# Patient Record
Sex: FEMALE | Race: WHITE | NOT HISPANIC OR LATINO | Employment: OTHER | URBAN - METROPOLITAN AREA
[De-identification: names, ages, dates, MRNs, and addresses within clinical notes are randomized per-mention and may not be internally consistent; named-entity substitution may affect disease eponyms.]

---

## 2022-08-30 ENCOUNTER — APPOINTMENT (OUTPATIENT)
Dept: CT IMAGING | Facility: CLINIC | Age: 57
End: 2022-08-30
Attending: EMERGENCY MEDICINE

## 2022-08-30 ENCOUNTER — HOSPITAL ENCOUNTER (EMERGENCY)
Facility: CLINIC | Age: 57
Discharge: HOME | End: 2022-08-30
Attending: EMERGENCY MEDICINE | Admitting: EMERGENCY MEDICINE

## 2022-08-30 VITALS
DIASTOLIC BLOOD PRESSURE: 75 MMHG | OXYGEN SATURATION: 97 % | TEMPERATURE: 98.3 F | HEART RATE: 63 BPM | RESPIRATION RATE: 16 BRPM | SYSTOLIC BLOOD PRESSURE: 133 MMHG

## 2022-08-30 DIAGNOSIS — K08.89 PAIN, DENTAL: ICD-10-CM

## 2022-08-30 DIAGNOSIS — R10.9 FLANK PAIN: ICD-10-CM

## 2022-08-30 LAB
ALBUMIN SERPL-MCNC: 3.3 G/DL (ref 3.4–5)
ALBUMIN UR-MCNC: 30 MG/DL
ALP SERPL-CCNC: 101 U/L (ref 40–150)
ALT SERPL W P-5'-P-CCNC: 33 U/L (ref 0–50)
AMORPH CRY #/AREA URNS HPF: ABNORMAL /HPF
ANION GAP SERPL CALCULATED.3IONS-SCNC: 2 MMOL/L (ref 3–14)
APPEARANCE UR: ABNORMAL
AST SERPL W P-5'-P-CCNC: 38 U/L (ref 0–45)
BASOPHILS # BLD AUTO: 0 10E3/UL (ref 0–0.2)
BASOPHILS NFR BLD AUTO: 1 %
BILIRUB SERPL-MCNC: 0.3 MG/DL (ref 0.2–1.3)
BILIRUB UR QL STRIP: NEGATIVE
BUN SERPL-MCNC: 9 MG/DL (ref 7–30)
CALCIUM SERPL-MCNC: 9.1 MG/DL (ref 8.5–10.1)
CAOX CRY #/AREA URNS HPF: ABNORMAL /HPF
CHLORIDE BLD-SCNC: 109 MMOL/L (ref 94–109)
CO2 SERPL-SCNC: 30 MMOL/L (ref 20–32)
COLOR UR AUTO: YELLOW
CREAT SERPL-MCNC: 0.55 MG/DL (ref 0.52–1.04)
EOSINOPHIL # BLD AUTO: 0.1 10E3/UL (ref 0–0.7)
EOSINOPHIL NFR BLD AUTO: 2 %
ERYTHROCYTE [DISTWIDTH] IN BLOOD BY AUTOMATED COUNT: 13.5 % (ref 10–15)
GFR SERPL CREATININE-BSD FRML MDRD: >90 ML/MIN/1.73M2
GLUCOSE BLD-MCNC: 95 MG/DL (ref 70–99)
GLUCOSE UR STRIP-MCNC: NEGATIVE MG/DL
HCT VFR BLD AUTO: 38.1 % (ref 35–47)
HGB BLD-MCNC: 12.9 G/DL (ref 11.7–15.7)
HGB UR QL STRIP: NEGATIVE
HYALINE CASTS: 6 /LPF
IMM GRANULOCYTES # BLD: 0 10E3/UL
IMM GRANULOCYTES NFR BLD: 1 %
KETONES UR STRIP-MCNC: NEGATIVE MG/DL
LEUKOCYTE ESTERASE UR QL STRIP: ABNORMAL
LYMPHOCYTES # BLD AUTO: 2 10E3/UL (ref 0.8–5.3)
LYMPHOCYTES NFR BLD AUTO: 45 %
MCH RBC QN AUTO: 35 PG (ref 26.5–33)
MCHC RBC AUTO-ENTMCNC: 33.9 G/DL (ref 31.5–36.5)
MCV RBC AUTO: 103 FL (ref 78–100)
MONOCYTES # BLD AUTO: 0.6 10E3/UL (ref 0–1.3)
MONOCYTES NFR BLD AUTO: 13 %
MUCOUS THREADS #/AREA URNS LPF: PRESENT /LPF
NEUTROPHILS # BLD AUTO: 1.7 10E3/UL (ref 1.6–8.3)
NEUTROPHILS NFR BLD AUTO: 38 %
NITRATE UR QL: NEGATIVE
NRBC # BLD AUTO: 0 10E3/UL
NRBC BLD AUTO-RTO: 0 /100
PH UR STRIP: 5.5 [PH] (ref 5–7)
PLATELET # BLD AUTO: 222 10E3/UL (ref 150–450)
POTASSIUM BLD-SCNC: 3.9 MMOL/L (ref 3.4–5.3)
PROT SERPL-MCNC: 6.9 G/DL (ref 6.8–8.8)
RBC # BLD AUTO: 3.69 10E6/UL (ref 3.8–5.2)
RBC URINE: 3 /HPF
SODIUM SERPL-SCNC: 141 MMOL/L (ref 133–144)
SP GR UR STRIP: 1.03 (ref 1–1.03)
SQUAMOUS EPITHELIAL: 34 /HPF
UROBILINOGEN UR STRIP-MCNC: 2 MG/DL
WBC # BLD AUTO: 4.4 10E3/UL (ref 4–11)
WBC URINE: 4 /HPF

## 2022-08-30 PROCEDURE — 74176 CT ABD & PELVIS W/O CONTRAST: CPT

## 2022-08-30 PROCEDURE — 250N000013 HC RX MED GY IP 250 OP 250 PS 637: Performed by: EMERGENCY MEDICINE

## 2022-08-30 PROCEDURE — 250N000011 HC RX IP 250 OP 636: Performed by: EMERGENCY MEDICINE

## 2022-08-30 PROCEDURE — 85014 HEMATOCRIT: CPT | Performed by: EMERGENCY MEDICINE

## 2022-08-30 PROCEDURE — 96374 THER/PROPH/DIAG INJ IV PUSH: CPT

## 2022-08-30 PROCEDURE — 99284 EMERGENCY DEPT VISIT MOD MDM: CPT | Mod: 25

## 2022-08-30 PROCEDURE — 80053 COMPREHEN METABOLIC PANEL: CPT | Performed by: EMERGENCY MEDICINE

## 2022-08-30 PROCEDURE — 81001 URINALYSIS AUTO W/SCOPE: CPT | Performed by: EMERGENCY MEDICINE

## 2022-08-30 PROCEDURE — 99284 EMERGENCY DEPT VISIT MOD MDM: CPT | Performed by: EMERGENCY MEDICINE

## 2022-08-30 PROCEDURE — 36415 COLL VENOUS BLD VENIPUNCTURE: CPT | Performed by: EMERGENCY MEDICINE

## 2022-08-30 RX ORDER — KETOROLAC TROMETHAMINE 15 MG/ML
10 INJECTION, SOLUTION INTRAMUSCULAR; INTRAVENOUS ONCE
Status: COMPLETED | OUTPATIENT
Start: 2022-08-30 | End: 2022-08-30

## 2022-08-30 RX ORDER — HYDROCODONE BITARTRATE AND ACETAMINOPHEN 5; 325 MG/1; MG/1
1 TABLET ORAL ONCE
Status: COMPLETED | OUTPATIENT
Start: 2022-08-30 | End: 2022-08-30

## 2022-08-30 RX ORDER — SULFAMETHOXAZOLE/TRIMETHOPRIM 800-160 MG
1 TABLET ORAL 2 TIMES DAILY
Qty: 14 TABLET | Refills: 0 | Status: SHIPPED | OUTPATIENT
Start: 2022-08-30 | End: 2022-09-06

## 2022-08-30 RX ADMIN — HYDROCODONE BITARTRATE AND ACETAMINOPHEN 1 TABLET: 5; 325 TABLET ORAL at 14:07

## 2022-08-30 RX ADMIN — KETOROLAC TROMETHAMINE 10 MG: 15 INJECTION, SOLUTION INTRAMUSCULAR; INTRAVENOUS at 14:07

## 2022-08-30 ASSESSMENT — ENCOUNTER SYMPTOMS
FEVER: 0
VOMITING: 1
FLANK PAIN: 1
NAUSEA: 1
DYSURIA: 1
SHORTNESS OF BREATH: 0
ABDOMINAL PAIN: 0

## 2022-08-30 ASSESSMENT — ACTIVITIES OF DAILY LIVING (ADL)
ADLS_ACUITY_SCORE: 35
ADLS_ACUITY_SCORE: 35

## 2022-08-30 NOTE — ED PROVIDER NOTES
"ED Provider Note  [facility] EMERGENCY DEPARTMENT ([facility])    8/30/22          History     Chief Complaint   Patient presents with     UTI     Flank Pain     The history is provided by the patient and medical records.     Cortney Acevedo is a 57 year old female with past medical history significant for pyelonephritis who presents to the ED for evaluation of left flank pain and dental pain of a right lower tooth.  Patient presents here with her boyfriend.  She reports 3 weeks of dysuria and cloudy urine.  She tried taking ibuprofen and cranberry juice/pills for this without relief of symptoms.  Yesterday she developed a stabbing pain on the left flank.  She reports nausea and vomiting, last episode of emesis was today.  She has not eaten today.  She denies chance of pregnancy.  She denies any previous abdominal surgeries.  No current abdominal pain.  No chest pain, fever, or shortness of breath.  She reports a history of pyelonephritis several years ago.  She also reports that 15 years ago she believes she passed a kidney stone because she saw a \"sliver\" in the toilet and had excruciating pain.     The patient also reports several months of dental pain of a right lower tooth.      Past Medical History  No past medical history on file.  No past surgical history on file.  No current outpatient medications on file.    No Known Allergies  Family History  No family history on file.  Social History       Past medical history, past surgical history, medications, allergies, family history, and social history were reviewed with the patient. No additional pertinent items.       Review of Systems   Constitutional: Negative for fever.   HENT: Positive for dental problem (right lower tooth).    Respiratory: Negative for shortness of breath.    Cardiovascular: Negative for chest pain.   Gastrointestinal: Positive for nausea and vomiting. Negative for abdominal pain. "   Genitourinary: Positive for dysuria and flank pain (left).   All other systems reviewed and are negative.    A complete review of systems was performed with pertinent positives and negatives noted in the HPI, and all other systems negative.    Physical Exam   BP: 134/75  Pulse: 80  Temp: 98.2  F (36.8  C)  Resp: 18  SpO2: 98 %  Physical Exam  Constitutional:       General: She is not in acute distress.     Appearance: She is well-developed. She is not ill-appearing, toxic-appearing or diaphoretic.   HENT:      Head: Normocephalic and atraumatic.      Mouth/Throat:      Comments: Right tooth #31 is very loose.  No apical abscess.  Mild tenderness to touch.  No swelling past angle of mandible.  No trismus.  Cardiovascular:      Rate and Rhythm: Normal rate and regular rhythm.      Heart sounds: Normal heart sounds.   Pulmonary:      Effort: Pulmonary effort is normal. No respiratory distress.      Breath sounds: Normal breath sounds.   Abdominal:      General: There is no distension.      Palpations: Abdomen is soft. There is no mass.      Tenderness: There is no abdominal tenderness. There is no rebound.      Hernia: No hernia is present.   Musculoskeletal:         General: No tenderness.      Cervical back: Normal range of motion.   Skin:     General: Skin is warm and dry.   Neurological:      General: No focal deficit present.      Mental Status: She is alert and oriented to person, place, and time.   Psychiatric:         Mood and Affect: Mood normal.         Behavior: Behavior normal.         Thought Content: Thought content normal.         ED Course     1:25 PM  The patient was seen and examined by Paz Hsu MD in Room ED05.     Procedures       The medical record was reviewed and interpreted.  Current labs reviewed and interpreted.  Previous labs reviewed and interpreted.       Results for orders placed or performed during the hospital encounter of 08/30/22   CT Abdomen Pelvis w/o Contrast      Status: None    Narrative    CT ABDOMEN PELVIS W/O CONTRAST 8/30/2022 2:22 PM    CLINICAL HISTORY: left sided flank pain  TECHNIQUE: CT scan of the abdomen and pelvis was performed without IV  contrast. Multiplanar reformats were obtained. Dose reduction  techniques were used.  CONTRAST: None.    COMPARISON: None.    FINDINGS:   LOWER CHEST: Unremarkable.    HEPATOBILIARY: Normal.    PANCREAS: Normal.    SPLEEN: Normal.    ADRENAL GLANDS: Normal.    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 No nephroureterolithiasis or hydronephrosis. Urinary  bladder is unremarkable.    BOWEL: No obstruction or inflammatory change.    LYMPH NODES: Normal.    VASCULATURE: Mild scattered calcified atherosclerosis.    PELVIC ORGANS: Normal.    OTHER: None.    MUSCULOSKELETAL: No acute bony abnormality.      Impression    IMPRESSION:   1.  No visualized explanation for patient's symptoms. Specifically, no  nephroureterolithiasis or hydronephrosis.    RADHA MYRICK MD         SYSTEM ID:  [id number]   UA with Microscopic reflex to Culture     Status: Abnormal    Specimen: Urine, Midstream   Result Value Ref Range    Color Urine Yellow Colorless, Straw, Light Yellow, Yellow    Appearance Urine Slightly Cloudy (A) Clear    Glucose Urine Negative Negative mg/dL    Bilirubin Urine Negative Negative    Ketones Urine Negative Negative mg/dL    Specific Gravity Urine 1.027 1.003 - 1.035    Blood Urine Negative Negative    pH Urine 5.5 5.0 - 7.0    Protein Albumin Urine 30  (A) Negative mg/dL    Urobilinogen Urine 2.0 Normal, 2.0 mg/dL    Nitrite Urine Negative Negative    Leukocyte Esterase Urine Small (A) Negative    Mucus Urine Present (A) None Seen /LPF    Amorphous Crystals Urine Few (A) None Seen /HPF    Calcium Oxalate Crystals Urine Many (A) None Seen /HPF    RBC Urine 3 (H) <=2 /HPF    WBC Urine 4 <=5 /HPF    Squamous Epithelials Urine 34 (H) <=1 /HPF    Hyaline Casts Urine 6 (H) <=2 /LPF     Narrative    Urine Culture not indicated   Comprehensive metabolic panel     Status: Abnormal   Result Value Ref Range    Sodium 141 133 - 144 mmol/L    Potassium 3.9 3.4 - 5.3 mmol/L    Chloride 109 94 - 109 mmol/L    Carbon Dioxide (CO2) 30 20 - 32 mmol/L    Anion Gap 2 (L) 3 - 14 mmol/L    Urea Nitrogen 9 7 - 30 mg/dL    Creatinine 0.55 0.52 - 1.04 mg/dL    Calcium 9.1 8.5 - 10.1 mg/dL    Glucose 95 70 - 99 mg/dL    Alkaline Phosphatase 101 40 - 150 U/L    AST 38 0 - 45 U/L    ALT 33 0 - 50 U/L    Protein Total 6.9 6.8 - 8.8 g/dL    Albumin 3.3 (L) 3.4 - 5.0 g/dL    Bilirubin Total 0.3 0.2 - 1.3 mg/dL    GFR Estimate >90 >60 mL/min/1.73m2   CBC with platelets and differential     Status: Abnormal   Result Value Ref Range    WBC Count 4.4 4.0 - 11.0 10e3/uL    RBC Count 3.69 (L) 3.80 - 5.20 10e6/uL    Hemoglobin 12.9 11.7 - 15.7 g/dL    Hematocrit 38.1 35.0 - 47.0 %     (H) 78 - 100 fL    MCH 35.0 (H) 26.5 - 33.0 pg    MCHC 33.9 31.5 - 36.5 g/dL    RDW 13.5 10.0 - 15.0 %    Platelet Count 222 150 - 450 10e3/uL    % Neutrophils 38 %    % Lymphocytes 45 %    % Monocytes 13 %    % Eosinophils 2 %    % Basophils 1 %    % Immature Granulocytes 1 %    NRBCs per 100 WBC 0 <1 /100    Absolute Neutrophils 1.7 1.6 - 8.3 10e3/uL    Absolute Lymphocytes 2.0 0.8 - 5.3 10e3/uL    Absolute Monocytes 0.6 0.0 - 1.3 10e3/uL    Absolute Eosinophils 0.1 0.0 - 0.7 10e3/uL    Absolute Basophils 0.0 0.0 - 0.2 10e3/uL    Absolute Immature Granulocytes 0.0 <=0.4 10e3/uL    Absolute NRBCs 0.0 10e3/uL   CBC with platelets differential     Status: Abnormal    Narrative    The following orders were created for panel order CBC with platelets differential.  Procedure                               Abnormality         Status                     ---------                               -----------         ------                     CBC with platelets and d...[[id number]]  Abnormal            Final result                 Please view results  for these tests on the individual orders.     Medications   HYDROcodone-acetaminophen (NORCO) 5-325 MG per tablet 1 tablet (1 tablet Oral Given 8/30/22 1407)   ketorolac (TORADOL) injection 10 mg (10 mg Intravenous Given 8/30/22 1407)            No results found for any visits on 08/30/22.  Medications - No data to display     Assessments & Plan (with Medical Decision Making)   Patient presents to the ER due to 3 weeks of ongoing right-sided pain with concern for UTI.  Patient's UA shows no acute infection though there is significant crystals that are visible.  We did obtain a CT abdomen and pelvis to look for kidney stone which is negative.  Plan will be to treat with a course of Bactrim to cover both the dental pain and possible occult UTI.  Patient agrees this plan of care.  Patient to follow-up with both PCP and dental for further care as an outpatient.    I have reviewed the nursing notes. I have reviewed the findings, diagnosis, plan and need for follow up with the patient.    New Prescriptions    No medications on file       Final diagnoses:   Flank pain   Pain, dental     I, Beena Solorzano, am serving as a trained medical scribe to document services personally performed by Paz Hsu MD based on the provider's statements to me on August 30, 2022.  This document has been checked and approved by the attending provider.    I, Paz Hsu MD, was physically present and have reviewed and verified the accuracy of this note documented by Beena Solorzano, medical scribe.      --    [facility] EMERGENCY DEPARTMENT  8/30/2022     Paz Hsu MD  08/30/22 1518

## 2022-08-30 NOTE — ED TRIAGE NOTES
Pt reports having UTI's on and off for about a month, has not been treated for them. Woke up today with intense left kidney pain. Patient also reports a loose wisdom tooth on left lower jaw that is causing intense pain.

## 2022-08-30 NOTE — DISCHARGE INSTRUCTIONS
Take the antibiotics as prescribed.     Your CT abdomen is normal with no signs of kidney stone.     Please make an appointment to follow [facility] with [facility] (phone: [phone]) in 3-5 days even if entirely better.    Orajel or Anbesol to affected area. (You may obtain this from any pharmacy)  Tylenol or Ibuprofen for pain.  Use prescription medication as directed.  Follow up with your Dentist or a dental clinic listed below.    Many of these clinics offer a sliding fee option for patients that qualify, and see patients on a walk-in or same day basis. Please call each clinic directly. As services, hours, fees and policies vary greatly.    [place]:  [facility]     [phone]  [facility] [phone]  [facility]  [phone]  [facility]      [phone]   [facility]    [phone]  [facility]  [phone]  [facility] [phone]  [facility]     [phone]  [facility]   [phone]  [facility] (cash only)   [phone]  [facility]    [phone] (adults)          [phone] (children)    [place]:  [facility]     [phone]; [phone]  [facility]     [phone]  [facility]     [phone]  [facility] (free, limited)    [phone]    Multiple Locations:  [facility]       [phone]

## 2024-02-22 ENCOUNTER — HOSPITAL ENCOUNTER (EMERGENCY)
Facility: CLINIC | Age: 59
Discharge: HOME | End: 2024-02-22
Attending: EMERGENCY MEDICINE | Admitting: EMERGENCY MEDICINE

## 2024-02-22 VITALS
SYSTOLIC BLOOD PRESSURE: 111 MMHG | BODY MASS INDEX: 31.49 KG/M2 | DIASTOLIC BLOOD PRESSURE: 66 MMHG | TEMPERATURE: 97.4 F | RESPIRATION RATE: 16 BRPM | HEIGHT: 58 IN | WEIGHT: 150 LBS | OXYGEN SATURATION: 94 %

## 2024-02-22 DIAGNOSIS — K04.7 TOOTH INFECTION: ICD-10-CM

## 2024-02-22 PROCEDURE — 99283 EMERGENCY DEPT VISIT LOW MDM: CPT | Performed by: EMERGENCY MEDICINE

## 2024-02-22 PROCEDURE — 250N000013 HC RX MED GY IP 250 OP 250 PS 637: Performed by: EMERGENCY MEDICINE

## 2024-02-22 PROCEDURE — 99284 EMERGENCY DEPT VISIT MOD MDM: CPT | Performed by: EMERGENCY MEDICINE

## 2024-02-22 RX ORDER — LIDOCAINE HYDROCHLORIDE AND EPINEPHRINE 10; 10 MG/ML; UG/ML
20 INJECTION, SOLUTION INFILTRATION; PERINEURAL ONCE
Status: DISCONTINUED | OUTPATIENT
Start: 2024-02-22 | End: 2024-02-22 | Stop reason: HOSPADM

## 2024-02-22 RX ORDER — AMOXICILLIN 500 MG/1
500 CAPSULE ORAL 3 TIMES DAILY
Qty: 21 CAPSULE | Refills: 0 | Status: SHIPPED | OUTPATIENT
Start: 2024-02-22 | End: 2024-02-29

## 2024-02-22 RX ORDER — AMOXICILLIN 250 MG/1
500 CAPSULE ORAL ONCE
Status: COMPLETED | OUTPATIENT
Start: 2024-02-22 | End: 2024-02-22

## 2024-02-22 RX ADMIN — AMOXICILLIN 500 MG: 250 CAPSULE ORAL at 11:28

## 2024-02-22 ASSESSMENT — COLUMBIA-SUICIDE SEVERITY RATING SCALE - C-SSRS
6. HAVE YOU EVER DONE ANYTHING, STARTED TO DO ANYTHING, OR PREPARED TO DO ANYTHING TO END YOUR LIFE?: NO
1. IN THE PAST MONTH, HAVE YOU WISHED YOU WERE DEAD OR WISHED YOU COULD GO TO SLEEP AND NOT WAKE UP?: NO
2. HAVE YOU ACTUALLY HAD ANY THOUGHTS OF KILLING YOURSELF IN THE PAST MONTH?: NO

## 2024-02-22 ASSESSMENT — ACTIVITIES OF DAILY LIVING (ADL)
ADLS_ACUITY_SCORE: 33

## 2024-02-22 NOTE — ED PROVIDER NOTES
"ED Provider Note  [facility]      History     Chief Complaint   Patient presents with    Dental Pain     Left upper tooth pain for couple days now      HPI  Cortney Acevedo is a 58 year old female with no significant PMH who presents for dental pain. She states the pain is coming from a tooth on the top right, and points to tooth #4. This tooth has been hurting for months, however in the last three days the pain has become \"unbearable\" and has been radiating across the right face (this feels like a \"migraine\"). She says the tooth became loose in December. She has tried ibuprofen and tylenol, and topical benzocaine gel (anbesol) with little relief. She has had difficulty sleeping and eating due to the pain and has been fatigued.     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 She was counseled that tooth #4 had a poor prognosis.     She also notes that a tooth on the lower left has been a little more painful recently and has gotten loose.     Past Medical History  History reviewed. No pertinent past medical history.  History reviewed. No pertinent surgical history.  amoxicillin (AMOXIL) 500 MG capsule      No Known Allergies  Social History   Social History     Tobacco Use    Smoking status: Every Day     Packs/day: .25     Types: Cigarettes    Smokeless tobacco: Never   Substance Use Topics    Alcohol use: Not Currently    Drug use: Not Currently      Past medical history and social history were reviewed with the patient. Additional pertinent items: None     A medically appropriate review of systems was performed with pertinent positives and negatives noted in the HPI, and all other systems negative.    Physical Exam   BP: 111/66  Temp: 97.4  F (36.3  C)  Resp: 16  Height: 147.3 cm (4' 10\")  Weight: 68 kg (150 lb)  SpO2: 94 %  Physical Exam  Constitutional:       General: She is " not in acute distress.     Appearance: She is not ill-appearing.   HENT:      Head:      Jaw: No swelling.      Mouth/Throat:        Comments: Gingival erythema of upper right gums near tooth #4  Teeth with arrows are loose. Tooth #4 has black plaque on it.   Cardiovascular:      Rate and Rhythm: Normal rate and regular rhythm.   Pulmonary:      Breath sounds: Normal breath sounds.   Neurological:      Mental Status: She is alert.         ED Course, Procedures, & Data      Procedures                   No results found for any visits on 02/22/24.  Medications   lidocaine 1% with EPINEPHrine 1:100,000 injection 20 mL (has no administration in time range)   amoxicillin (AMOXIL) capsule 500 mg (500 mg Oral $Given 2/22/24 1128)     Labs Ordered and Resulted from Time of ED Arrival to Time of ED Departure - No data to display  No orders to display          Critical care was not performed.       Assessment & Plan      Dental pain 2/2 periodontisis   Patient has known periodontitis of tooth #4. We provided patient with an infraorbital nerve block in the ED, gave a course of amoxicillin, and counseled patient to follow up with her dentist as soon as possible for evaluation and likely extraction of tooth #4 and potentially tooth on lower left as well.     Discharge Medication List as of 2/22/2024 12:02 PM        START taking these medications    Details   amoxicillin (AMOXIL) 500 MG capsule Take 1 capsule (500 mg) by mouth 3 times daily for 7 days, Disp-21 capsule, R-0, E-Prescribe             Final diagnoses:   Tooth infection     Ana Loya, MS4  Karan Remy  [facility] EMERGENCY DEPARTMENT  2/22/2024    ED Attending Physician Attestation    I Karan Remy DO, cared for this patient with the medical student. I have performed a history and physical examination of the patient and discussed management with the medical student. I reviewed the medical student's documentation above and agree with  the documented findings and plan of care.    Summary of HPI, PE, ED Course   Patient evaluated in the emergency department for dental pain.  Exam notable for tenderness and looseness of the offending tooth.  Nerve block was done.  Pain immediately was relieved.  We will discharge her with antibiotics to follow-up with dental    Critical Care & Procedures  Procedure: Infraorbital nerve block right  Indication: Tooth pain  Materials: 1% lidocaine with epi to mL  Technique: Needle was advanced superiorly parallel to the canine and lidocaine was infused in the area of the infraorbital foramen  Complications none patient tolerated well        Medical Decision Making  The patient's presentation is strongly suggestive of low complexity (an acute and uncomplicated illness or injury).    The patient's evaluation involved:  review of external note(s) from 3+ sources (see separate area of note for details)  ordering and/or review of 3+ test(s) in this encounter (see separate area of note for details)  review of 3+ test result(s) ordered prior to this encounter (see separate area of note for details)    The patient's management involved moderate risk (a decision regarding minor procedure (nerve block) with risk factors of none).      Karan Remy,   Emergency Medicine        Karan Remy DO  02/22/24 1244

## 2024-02-22 NOTE — ED TRIAGE NOTES
Patient reports she has stefani taking ibuprofen and tylenol for pain with little relief to pain.      Triage Assessment (Adult)       Row Name 02/22/24 0952          Triage Assessment    Airway WDL WDL        Respiratory WDL    Respiratory WDL WDL        Skin Circulation/Temperature WDL    Skin Circulation/Temperature WDL WDL        Cardiac WDL    Cardiac WDL WDL        Peripheral/Neurovascular WDL    Peripheral Neurovascular WDL WDL        Cognitive/Neuro/Behavioral WDL    Cognitive/Neuro/Behavioral WDL WDL

## 2024-02-26 ENCOUNTER — APPOINTMENT (OUTPATIENT)
Dept: ADMINISTRATIVE | Facility: CLINIC | Age: 59
End: 2024-02-26